# Patient Record
(demographics unavailable — no encounter records)

---

## 2025-07-23 NOTE — ASSESSMENT
[Diabetes Foot Care] : diabetes foot care [Long Term Vascular Complications] : long term vascular complications of diabetes [Carbohydrate Consistent Diet] : carbohydrate consistent diet [Importance of Diet and Exercise] : importance of diet and exercise to improve glycemic control, achieve weight loss and improve cardiovascular health [Hypoglycemia Management] : hypoglycemia management [FreeTextEntry1] : persistent non compliance with all medical advice, in partcular not taking any injections., will check labs today, again encouraged compliance.

## 2025-07-23 NOTE — HISTORY OF PRESENT ILLNESS
[FreeTextEntry1] : persistent non compliance, keeps stopping meds, does not follow any medical advice.